# Patient Record
Sex: MALE | Race: WHITE | URBAN - METROPOLITAN AREA
[De-identification: names, ages, dates, MRNs, and addresses within clinical notes are randomized per-mention and may not be internally consistent; named-entity substitution may affect disease eponyms.]

---

## 2020-11-23 ENCOUNTER — HISTORICAL (OUTPATIENT)
Dept: ADMINISTRATIVE | Facility: HOSPITAL | Age: 25
End: 2020-11-23

## 2022-04-29 VITALS — HEIGHT: 68 IN | BODY MASS INDEX: 28.34 KG/M2 | WEIGHT: 187 LBS

## 2022-05-02 NOTE — HISTORICAL [facility] CERNER
This is a historical note converted from [facility]. Formatting and pictures may have been removed.  Please reference Heidi for original formatting and attached multimedia. Chief Complaint  Left Shoulder Pain  History of Present Illness  24-year-old male?right-hand-dominant?coming in with left shoulder pain.? Patient pain?started when he was in the [employer] in 2014?and?had something?on his back that?fell to the right?and?his left arm got?pulled over his head towards the right.? Since then he did some self?exercises?that relieves his pain but?he currently?is still working out and over the past few months he has noted pain with?overhead exercises.? 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 ?But when working out over his head he notices pain at the posterior aspect of the shoulder.  ?  He has tried NSAIDs with?some relief.? Has not tried formal physical therapy yet.  Review of Systems  No shortness breath or chest pain  Physical Exam  Vitals & Measurements  HT:?172.00?cm? WT:?84.820?kg? BMI:?28.67?  General NAD  Cardiology regular rate and rhythm per radial pulse  Left upper extremity  No wounds or abrasions,?tender to palpation of the posterior aspect of the shoulder?but no specific bony tenderness  Forward flexion?active?and passive?160 similar to contralateral  Abduction?160?active and passive?about?15 to 20 degrees less than contralateral  External rotation?about 50 degrees?bilaterally  Internal rotation to about?T12 similar to contralateral  Rotator cuff muscle/5 strength  Mild pain with Manasa,?mild pain with?active external?rotation against?force, both of these are reduced with scapular?retraction  Negative Hornblower negative belly press  Negative impingement with Neer and?Emery  Negative Speed  Negative apprehension  Negative?posterior?force?as well as negative jerk  AIN/PIN/ulnar/5 motor  Capillary refill less than 2 seconds  Sensation  intact throughout  ?  X-ray left shoulder: Shows maintained glenohumeral space?with glenohumeral joint reduced and appropriate alignment  Assessment/Plan  1.?Left shoulder pain?M25.512  24-year-old male with?possible?scapular dyskinesia versus?posterior cuff partial tear/inflammation?on the left side  -Patient is nitride for physical therapy and I think scapular?strengthening?as well as?posterior rotator cuff strengthening would?help this patient  -We will see patient back in 8 to 12 weeks  -At that time if he still having?the same pain or worsening can consider MRI at that time  ?  Emanuel Jameson?was evaluated at the time of the encounter with?Dr Go.? HPI, PE and treatment plan was reviewed.? Treatment plan was reasonable and necessary.??Imaging was reviewed at the time of visit.??   Medications  buPROPion 300 mg/24 hours (XL) oral tablet, extended release, 300 mg= 1 tab(s), Oral, Daily  cholecalciferol 2000 intl units (50 mcg) oral tablet, 50 mcg, Oral, Daily  cholecalciferol 2000 intl units (50 mcg) oral tablet, 50 mcg, Oral, Daily  cyanocobalamin 1000 mcg oral tablet, 1000 mcg= 1 tab(s), Oral, Daily  traZODONE 50 mg oral tablet ( Desyrel ), 50 mg= 1 tab(s), Oral, BID  valACYclovir 1 g oral tablet, 1 gm= 1 tab(s), Oral, Daily      Private car